# Patient Record
(demographics unavailable — no encounter records)

---

## 2025-03-08 NOTE — ASSESSMENT
[FreeTextEntry1] : 65 year old female with history of Stage IIA ([id number]) left breast cancer dx in 2017, s/p  left breast lumpectomy/sentinel lymph node dissection; ER positive 91% /WI positive 97%/HER-2 negative. Oncotype Score 15.  s/p adjuvant chemotherapy CMF every three weeks x 8 sessions finished June 2018. s/p radiation Therapy.   -Started endocrine therapy approximately July 2018 - did not tolerate AIs (attempted anastrazole, letrozole, exemestane) c/b significant leg cramps, switched to tamoxifen in 1/2021 and continues to the present time. Tolerating well.  Previously recommended to continue for total of 10 years of treatment.   -clinically FLY x 7.5 years -was previously following with breast surgeon Dr. Lamont Madison, but has not seen since 2021 as she "waited 6 hours in his office waiting room" -superficial breast nodularity - unchanged and previously evaluated  - repeat breast imaging now; will have done in at [facility]. - encouraged follo-w up with breast surgery - f/up with Dr. Fitzpatrick re: thyroid and osteopenia. --> will check TSH/T4 given symptoms of palpitations/anxiety  - Patient had the opportunity to have all their questions answered to their satisfaction - 6 weeks

## 2025-03-08 NOTE — PHYSICAL EXAM
[Fully active, able to carry on all pre-disease performance without restriction] : Status 0 - Fully active, able to carry on all pre-disease performance without restriction [Normal] : affect appropriate [de-identified] : STABLE: left lumpectomy, nodularity LUOQ 9CM FN 2 discrete areas 3 mm each; RUIQ 5 cm FN 5 mm nodularity; 6:00 inferior scar prominence - no dominant mass, nipple retraction, or discharge; b/l breast implants noted b/l; no adenopathy (unchanged breast findings)

## 2025-03-08 NOTE — ASSESSMENT
[FreeTextEntry1] : 65 year old female with history of Stage IIA ([id number]) left breast cancer dx in 2017, s/p  left breast lumpectomy/sentinel lymph node dissection; ER positive 91% /CT positive 97%/HER-2 negative. Oncotype Score 15.  s/p adjuvant chemotherapy CMF every three weeks x 8 sessions finished June 2018. s/p radiation Therapy.   -Started endocrine therapy approximately July 2018 - did not tolerate AIs (attempted anastrazole, letrozole, exemestane) c/b significant leg cramps, switched to tamoxifen in 1/2021 and continues to the present time. Tolerating well.  Previously recommended to continue for total of 10 years of treatment.   -clinically FLY x 7.5 years -was previously following with breast surgeon Dr. Lamont Madison, but has not seen since 2021 as she "waited 6 hours in his office waiting room" -superficial breast nodularity - unchanged and previously evaluated  - repeat breast imaging now; will have done in at [facility]. - encouraged follo-w up with breast surgery - f/up with Dr. Fitzpatrick re: thyroid and osteopenia. --> will check TSH/T4 given symptoms of palpitations/anxiety  - Patient had the opportunity to have all their questions answered to their satisfaction - 6 weeks

## 2025-03-08 NOTE — HISTORY OF PRESENT ILLNESS
[Disease: _____________________] : Disease: [unfilled] [T: ___] : T[unfilled] [N: ___] : N[unfilled] [AJCC Stage: ____] : AJCC Stage: [unfilled] [de-identified] : 65 year old female with history of Stage IIA ([id number]) left breast cancer dx in 2017 transferred care from [facility] to [facility].  Patient initially presented in 9/2017 for mammogram with history of a breast lump felt on the left breast self-exam approximately one year prior. Her last mammogram had been approximately 5 years prior. Mammogram revealed heterogeneously dense breast tissue with a suspicious spiculated architectural distortion in the left breast.   Left breast biopsy showed a moderately differentiated invasive ductal carcinoma measuring 0.3 cm, ER positive 91% /ME positive 97%/HER-2 negative. DCIS also noted.   Breast MRI showed a 2 cm enhancing mass in the left breast corresponding to the location of the patient's recently diagnosed carcinoma. No additional suspicious lesions were identified in the breasts/axillae.   Patient subsequently underwent a left breast lumpectomy/sentinel lymph node dissection with Dr. Lamont Madison.  Pathology revealed a 17 mm invasive ductal carcinoma, Neyda grade 2, along with DCIS. Margins negative. 1/11 sentinel lymph nodes positive for macrometastasis.   Oncotype Score 15:   Patient was treated w/ adjuvant chemotherapy CMF every three weeks x 8 sessions finished June 2018 with Dr. Cb Hutson.  s/p radiation Therapy w/ [facility]  Started endocrine therapy approximately July 2018 - did not tolerate AIs (attempted anastrazole, letrozole, exemestane) c/b significant leg cramps, switched to tamoxifen in 1/2021 and continues to the present time.    Planning for bilateral implants by Dr. Kendall plastic surgeon, 10/1/21.   Family history of breast cancer age 50.  No other family history of cancer.  My [employer] genetic test 1/8/16 negative for deleterious mutations.   Currently on Tamoxifen 20 mg daily tolerating well. Missing twice a week previously, now compliant.  Breast surgeon Dr. Lamont Madison, last seen 2/2021.   Last Mammo/US in 3/2024(negative) [facility] in [place] Last MRI 3/4/2024 [facility] - negative    [de-identified] : 17 mm invasive ductal carcinoma, Neyda grade 2, along with DCIS. Margins negative. 1/11 sentinel lymph nodes positive for macrometastasis [de-identified] : Oncotype Recurrence score of 15, placing patient in low risk category. [de-identified] : 3/7/2025 being seen for initial visit with me; has been following with Maureen WEBER since transferring care to [facility] All of the patient's prior records including radiology, pathology and prior notes reviewed; Past Medical History, Past Surgical History, Family History and Social history reviewed and updated in the patient's chart.  HORACIO returns today to assess for evidence of breast cancer recurrence and assess treatment toxicity - tamoxifen (did not tolerate AI)  Since last visit, had PET/CT in 9/2024 due to backpain; no evidence of spine lesions; but noted to have new soft tissue nodule overlying left implant 12/23/2024 - diagnostic mammo/sono Left breast coarse calcification at 10:00; new irregular nodule at 9:00;  1/25/25 - biopsy of left breast lesions at 10:00 benign, 9:00 fibroadenoma  + anxiety and palpitations persist with concerns about recurrent metastatic disease. 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 Heck on 7/23/2024 Patient denies any breast masses, breast tenderness, skin changes or nipple discharge; denies vaginal dryness  Mammogram/Sonogram 3/7/2024  - birads 2.0 (ZP) MRI Breast: 3/2024 Birads 2.0 (ZP)  HEALTH MAINtENANCE PCP Dr. Evgeny Lane BREAST SURGEON: Dr. Los Fitzpatrick for Osteopenia, hypothyroidism  Bone Density 3/2023 Osteopenia T -2.0, not taking any calcium.vitamin d felt it caused diarrhea GI Colonoscopy: Dr. Tiffany Bolanos - 7/2022 - one polyp  Abdominal Sono: fatty Liver  (h/o elevated alk phos) Bone Scan: Negative (h/o elevated alk phos) GYN Dr. Boone/Dr. Heck (gyn onc) DERM no previous skin exams

## 2025-03-08 NOTE — HISTORY OF PRESENT ILLNESS
[Disease: _____________________] : Disease: [unfilled] [T: ___] : T[unfilled] [N: ___] : N[unfilled] [AJCC Stage: ____] : AJCC Stage: [unfilled] [de-identified] : 65 year old female with history of Stage IIA ([id number]) left breast cancer dx in 2017 transferred care from [facility] to [facility].  Patient initially presented in 9/2017 for mammogram with history of a breast lump felt on the left breast self-exam approximately one year prior. Her last mammogram had been approximately 5 years prior. Mammogram revealed heterogeneously dense breast tissue with a suspicious spiculated architectural distortion in the left breast.   Left breast biopsy showed a moderately differentiated invasive ductal carcinoma measuring 0.3 cm, ER positive 91% /AR positive 97%/HER-2 negative. DCIS also noted.   Breast MRI showed a 2 cm enhancing mass in the left breast corresponding to the location of the patient's recently diagnosed carcinoma. No additional suspicious lesions were identified in the breasts/axillae.   Patient subsequently underwent a left breast lumpectomy/sentinel lymph node dissection with Dr. Lamont Madison.  Pathology revealed a 17 mm invasive ductal carcinoma, Neyda grade 2, along with DCIS. Margins negative. 1/11 sentinel lymph nodes positive for macrometastasis.   Oncotype Score 15:   Patient was treated w/ adjuvant chemotherapy CMF every three weeks x 8 sessions finished June 2018 with Dr. Cb Hutson.  s/p radiation Therapy w/ [facility]  Started endocrine therapy approximately July 2018 - did not tolerate AIs (attempted anastrazole, letrozole, exemestane) c/b significant leg cramps, switched to tamoxifen in 1/2021 and continues to the present time.    Planning for bilateral implants by Dr. Kendall plastic surgeon, 10/1/21.   Family history of breast cancer age 50.  No other family history of cancer.  My [employer] genetic test 1/8/16 negative for deleterious mutations.   Currently on Tamoxifen 20 mg daily tolerating well. Missing twice a week previously, now compliant.  Breast surgeon Dr. Lamont Madison, last seen 2/2021.   Last Mammo/US in 3/2024(negative) [facility] in [place] Last MRI 3/4/2024 [facility] - negative    [de-identified] : 17 mm invasive ductal carcinoma, Neyda grade 2, along with DCIS. Margins negative. 1/11 sentinel lymph nodes positive for macrometastasis [de-identified] : Oncotype Recurrence score of 15, placing patient in low risk category. [de-identified] : 3/7/2025 being seen for initial visit with me; has been following with Maureen WEBER since transferring care to [facility] All of the patient's prior records including radiology, pathology and prior notes reviewed; Past Medical History, Past Surgical History, Family History and Social history reviewed and updated in the patient's chart.  HORACIO returns today to assess for evidence of breast cancer recurrence and assess treatment toxicity - tamoxifen (did not tolerate AI)  Since last visit, had PET/CT in 9/2024 due to backpain; no evidence of spine lesions; but noted to have new soft tissue nodule overlying left implant 12/23/2024 - diagnostic mammo/sono Left breast coarse calcification at 10:00; new irregular nodule at 9:00;  1/25/25 - biopsy of left breast lesions at 10:00 benign, 9:00 fibroadenoma  + anxiety and palpitations persist with concerns about recurrent metastatic disease. 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 Heck on 7/23/2024 Patient denies any breast masses, breast tenderness, skin changes or nipple discharge; denies vaginal dryness  Mammogram/Sonogram 3/7/2024  - birads 2.0 (ZP) MRI Breast: 3/2024 Birads 2.0 (ZP)  HEALTH MAINtENANCE PCP Dr. Evgeny Lane BREAST SURGEON: Dr. Los Fitzpatrick for Osteopenia, hypothyroidism  Bone Density 3/2023 Osteopenia T -2.0, not taking any calcium.vitamin d felt it caused diarrhea GI Colonoscopy: Dr. Tiffany Bolanos - 7/2022 - one polyp  Abdominal Sono: fatty Liver  (h/o elevated alk phos) Bone Scan: Negative (h/o elevated alk phos) GYN Dr. Boone/Dr. Heck (gyn onc) DERM no previous skin exams

## 2025-03-08 NOTE — PHYSICAL EXAM
[Fully active, able to carry on all pre-disease performance without restriction] : Status 0 - Fully active, able to carry on all pre-disease performance without restriction [Normal] : affect appropriate [de-identified] : STABLE: left lumpectomy, nodularity LUOQ 9CM FN 2 discrete areas 3 mm each; RUIQ 5 cm FN 5 mm nodularity; 6:00 inferior scar prominence - no dominant mass, nipple retraction, or discharge; b/l breast implants noted b/l; no adenopathy (unchanged breast findings)

## 2025-03-13 NOTE — REASON FOR VISIT
[FreeTextEntry1] : [facility] [phone] at [address] [place] [address]  HAMILTON III - 7/23/24 (s/p WLE of L posterior vulva)  Vulvar Colposcopy

## 2025-03-13 NOTE — ASSESSMENT
[FreeTextEntry1] : No lesions seen today in office - normal colposcopy.  RTO 6 months for continued observation with interval colpo. If there are no abnormal findings at that time, may resume routine care with primary GYN.
Detail Level: Zone
Recommendations (Free Text): Cerave coupon and samples of hydrating cleanser and cream

## 2025-03-13 NOTE — REASON FOR VISIT
[FreeTextEntry1] : [facility] [phone] at [address] [place] [address]  HAMILTON III - 7/23/24 (s/p WLE of L posterior vulva)  Vulvar Colposcopy

## 2025-03-13 NOTE — PHYSICAL EXAM
[Chaperone Present] : A chaperone was present in the examining room during all aspects of the physical examination [Normal] : External genitalia: Normal, no lesions appreciated [FreeTextEntry2] : Maxine Miranda MA

## 2025-03-13 NOTE — REASON FOR VISIT
[FreeTextEntry1] : [facility] [phone] at [address] [place] [address]  HAMILTON III - 7/23/24 (s/p WLE of L posterior vulva)  Vulvar Colposcopy

## 2025-03-13 NOTE — REASON FOR VISIT
[FreeTextEntry1] : [facility] [phone] at [address] [place] [address]  HAMILTON III - 7/23/24 (s/p WLE of L posterior vulva)  Vulvar Colposcopy

## 2025-03-13 NOTE — PROCEDURE
[Colposcopy] : colposcopy [Patient] : the patient [Written Consent] : written consent was obtained prior to the procedure and is detailed in the patient's record [No Complications] : none [Tolerated Well] : the patient tolerated the procedure well [Site Verification] : site verification performed. [Time Out] : Time Out Procedure:The following was confirmed prior to the procedure-Correct patient identity, correct site, agreement on the procedure to be done and correct patient position. [No Abnormalities] : no abnormalities seen [FreeTextEntry9] : HAMILTON III

## 2025-03-13 NOTE — END OF VISIT
[FreeTextEntry3] : Written by Maxine Miranda, acting as a scribe for Dr. Dulce Heck. This note accurately reflects the work and decisions made by me.

## 2025-03-13 NOTE — HISTORY OF PRESENT ILLNESS
[FreeTextEntry1] : 66 y/o s/p EUA, wide local excision of left posterior medial vulva on 7/23/2024 which revealed HAMILTON grade 3. Patient presents today for 6 month f/u vulvar colposcopy. Pt denies any itching or new raised lesions.

## 2025-03-13 NOTE — HISTORY OF PRESENT ILLNESS
[FreeTextEntry1] : 64 y/o s/p EUA, wide local excision of left posterior medial vulva on 7/23/2024 which revealed HAMILTON grade 3. Patient presents today for 6 month f/u vulvar colposcopy. Pt denies any itching or new raised lesions.

## 2025-05-27 NOTE — END OF VISIT
[FreeTextEntry3] : Written by Dennise Cunningham, acting as a scribe for Dr. Dulce Heck. This note accurately reflects the work and decisions made by me.

## 2025-05-27 NOTE — HISTORY OF PRESENT ILLNESS
[FreeTextEntry1] : 66 y/o s/p EUA, wide local excision of left posterior medial vulva on 7/23/2024 which revealed HAMILTON grade 3. Patient presented last visit with no vulvar itching in March. Patient recently has complained about itching and presents today for a repeat exam

## 2025-05-27 NOTE — REASON FOR VISIT
[FreeTextEntry1] : [facility] [address] [facility] [phone]  HAMILTON III - 7/23/24 (s/p WLE of L posterior vulva)

## 2025-07-14 NOTE — DISCUSSION/SUMMARY
[FreeTextEntry1] : Stage IIA ([id number]) left breast cancer dx in 2017 transferred care from [facility] to [facility]. Patient initially presented in 9/2017 for mammogram with history of a breast lump felt on the left breast self-exam approximately one year prior. Her last mammogram had been approximately 5 years prior. Mammogram revealed heterogeneously dense breast tissue with a suspicious spiculated architectural distortion in the left breast. Left breast biopsy showed a moderately differentiated invasive ductal carcinoma measuring 0.3 cm, ER positive 91% /MA positive 97%/HER-2 negative. DCIS also noted. Breast MRI showed a 2 cm enhancing mass in the left breast corresponding to the location of the patient's recently diagnosed carcinoma. No additional suspicious lesions were identified in the breasts/axillae. Patient subsequently underwent a left breast lumpectomy/sentinel lymph node dissection with Dr. Lamont Madison. Pathology revealed a 17 mm invasive ductal carcinoma, Neyda grade 2, along with DCIS. Margins negative. 1/11 sentinel lymph nodes positive for macrometastasis. Oncotype Score 15: Patient was treated w/ adjuvant chemotherapy Cyclophosphamide/Methotrexate/Fluorouracil every three weeks x 8 sessions finished June 2018 with Dr. Cb Hutson. s/p radiation Therapy w/ [facility] Started endocrine therapy approximately July 2018 - did not tolerate AIs (attempted anastrazole, letrozole, exemestane) c/b significant leg cramps, switched to tamoxifen in 1/2021 and continues to the present time. Planning for bilateral implants by Dr. Kendall plastic surgeon, 10/1/21. Family history of breast cancer age 50. No other family history of cancer. My [employer] genetic test 1/8/16 negative for deleterious mutations. Disease: breast cancer   Pathology: 17 mm invasive ductal carcinoma, Kechi grade 2, along with DCIS. Margins negative. 1/11 sentinel lymph nodes positive for macrometastasis   TNM stage: T1c, N1a AJCC Stage: IIA Oncotype Recurrence score of 15, placing patient in low risk category.

## 2025-07-14 NOTE — PLAN
[FreeTextEntry2] : -- MRI breast completed in Dec 2023.  -- Mammogram/sonogram done March 2024. -- Annual breast and chest wall exam done 12/2023.  [FreeTextEntry6] : Colonoscopy due 2023 -- she will schedule for fall 2024.   [FreeTextEntry8] : ECG 12/2023 DEXA scan due 3/2025 [de-identified] : Discussed shifting towards a healthy semi plant-based diet which includes green leafy vegetables and low-fat foods.   Patient currently interested in losing 10 to 15 pounds and has started phentermine 15 mg daily along with daily exercise. [de-identified] : Discussed an exercise regimen that consists of 30 minutes of cardiovascular exercise 5 times a week.